# Patient Record
Sex: MALE | Race: BLACK OR AFRICAN AMERICAN | NOT HISPANIC OR LATINO | Employment: FULL TIME | URBAN - METROPOLITAN AREA
[De-identification: names, ages, dates, MRNs, and addresses within clinical notes are randomized per-mention and may not be internally consistent; named-entity substitution may affect disease eponyms.]

---

## 2022-10-10 ENCOUNTER — OFFICE VISIT (OUTPATIENT)
Dept: DERMATOLOGY | Facility: CLINIC | Age: 47
End: 2022-10-10
Payer: COMMERCIAL

## 2022-10-10 DIAGNOSIS — L73.1 PSEUDOFOLLICULITIS BARBAE: ICD-10-CM

## 2022-10-10 DIAGNOSIS — L91.8 ACROCHORDON: Primary | ICD-10-CM

## 2022-10-10 PROCEDURE — 1159F PR MEDICATION LIST DOCUMENTED IN MEDICAL RECORD: ICD-10-PCS | Mod: CPTII,S$GLB,, | Performed by: DERMATOLOGY

## 2022-10-10 PROCEDURE — 99204 PR OFFICE/OUTPT VISIT, NEW, LEVL IV, 45-59 MIN: ICD-10-PCS | Mod: S$GLB,,, | Performed by: DERMATOLOGY

## 2022-10-10 PROCEDURE — 99999 PR PBB SHADOW E&M-NEW PATIENT-LVL II: CPT | Mod: PBBFAC,,, | Performed by: DERMATOLOGY

## 2022-10-10 PROCEDURE — 1160F RVW MEDS BY RX/DR IN RCRD: CPT | Mod: CPTII,S$GLB,, | Performed by: DERMATOLOGY

## 2022-10-10 PROCEDURE — 1160F PR REVIEW ALL MEDS BY PRESCRIBER/CLIN PHARMACIST DOCUMENTED: ICD-10-PCS | Mod: CPTII,S$GLB,, | Performed by: DERMATOLOGY

## 2022-10-10 PROCEDURE — 1159F MED LIST DOCD IN RCRD: CPT | Mod: CPTII,S$GLB,, | Performed by: DERMATOLOGY

## 2022-10-10 PROCEDURE — 99204 OFFICE O/P NEW MOD 45 MIN: CPT | Mod: S$GLB,,, | Performed by: DERMATOLOGY

## 2022-10-10 PROCEDURE — 99999 PR PBB SHADOW E&M-NEW PATIENT-LVL II: ICD-10-PCS | Mod: PBBFAC,,, | Performed by: DERMATOLOGY

## 2022-10-10 RX ORDER — HYDROCHLOROTHIAZIDE 25 MG/1
25 TABLET ORAL DAILY
COMMUNITY
Start: 2022-08-04

## 2022-10-10 RX ORDER — CLINDAMYCIN PHOSPHATE 10 MG/G
GEL TOPICAL
Qty: 60 G | Refills: 3 | Status: SHIPPED | OUTPATIENT
Start: 2022-10-10

## 2022-10-10 RX ORDER — AMLODIPINE BESYLATE 10 MG/1
10 TABLET ORAL DAILY
COMMUNITY
Start: 2022-08-04

## 2022-10-10 NOTE — PROGRESS NOTES
Subjective:       Patient ID:  Timmy Simons is a 46 y.o. male who presents for   Chief Complaint   Patient presents with    Skin Tags     History of Present Illness: The patient presents with chief complaint of skin tags.  Location: base of neck, right temple  Duration: years  Signs/Symptoms: bothersome at times  Prior treatments: none     Review of Systems   Constitutional:  Negative for fever and chills.   Gastrointestinal:  Negative for nausea and vomiting.   Skin:  Positive for activity-related sunscreen use. Negative for daily sunscreen use and recent sunburn.   Hematologic/Lymphatic: Does not bruise/bleed easily.      Objective:    Physical Exam   Constitutional: He appears well-developed and well-nourished. No distress.   Neurological: He is alert and oriented to person, place, and time. He is not disoriented.   Psychiatric: He has a normal mood and affect.   Skin:   Areas Examined (abnormalities noted in diagram):   Head / Face Inspection Performed  Neck Inspection Performed  Chest / Axilla Inspection Performed  Abdomen Inspection Performed  Back Inspection Performed  RUE Inspected  LUE Inspection Performed  Nails and Digits Inspection Performed                     Diagram Legend       Pedunculated fleshy papule(s) c/w skin tag(s)         Assessment / Plan:        Acrochordon  Reassurance provided.  Informed patient that lesions are benign.  After risks, benefits and alternatives explained, including allergic reaction to anesthesia (if given), pain, recurrence, and scar, and verbal consent was obtained, 8 lesions treated with scissor excision.  Patient tolerated well.  Hemostasis achieved with aluminum chloride.  Verbal wound care instructions were given.     Pseudofolliculitis barbae  -     clindamycin phosphate 1% (CLINDAGEL) 1 % gel; AAA bid prn  Dispense: 60 g; Refill: 3  -     mild, will start above med bid prn.            Follow up if symptoms worsen or fail to improve.

## 2025-03-13 ENCOUNTER — TELEPHONE (OUTPATIENT)
Dept: DERMATOLOGY | Facility: CLINIC | Age: 50
End: 2025-03-13
Payer: COMMERCIAL

## 2025-03-13 NOTE — TELEPHONE ENCOUNTER
Returned call. Pt notified next available Wednesday morning is 7/2. Pt added to waitlist   ----- Message from Yaneli Valentin sent at 3/13/2025 12:01 PM CDT -----  Regarding: appt access  PATIENT CALLPt called regarding recurrent skin tags. Last seen 10/2022, would like an appt on any Wednesday morning that the provider has available. Please call back at [phone]

## 2025-07-02 ENCOUNTER — OFFICE VISIT (OUTPATIENT)
Dept: DERMATOLOGY | Facility: CLINIC | Age: 50
End: 2025-07-02
Payer: COMMERCIAL

## 2025-07-02 DIAGNOSIS — B07.9 FILIFORM WART: Primary | ICD-10-CM

## 2025-07-02 PROCEDURE — 99999 PR PBB SHADOW E&M-EST. PATIENT-LVL III: CPT | Mod: PBBFAC,,, | Performed by: DERMATOLOGY

## 2025-07-02 PROCEDURE — 1159F MED LIST DOCD IN RCRD: CPT | Mod: CPTII,S$GLB,, | Performed by: DERMATOLOGY

## 2025-07-02 PROCEDURE — 1160F RVW MEDS BY RX/DR IN RCRD: CPT | Mod: CPTII,S$GLB,, | Performed by: DERMATOLOGY

## 2025-07-02 PROCEDURE — 99212 OFFICE O/P EST SF 10 MIN: CPT | Mod: S$GLB,,, | Performed by: DERMATOLOGY

## 2025-07-02 NOTE — PATIENT INSTRUCTIONS
Wart Treatment Instructions  Once wart(s) heals from scissor removal, start over-the-counter 17% salicyclic acid cream or liquid at bedtime and cover with bandage.  Use a nail file or emery board to file down the wart several times/week to keep the wart soft.  Use this medication nightly until the wart resolves or for at least 3 weeks.      Cryosurgery    Your doctor has used a method called cryosurgery to treat your skin condition. Cryosurgery refers to the use of very cold substances to treat a variety of skin conditions such as warts, pre-skin cancers, molluscum contagiosum, sun spots, and several benign growths. The substance we use in cryosurgery is liquid nitrogen and is so cold (-195 degrees Celsius) that is burns when administered.     Following treatment in the office, the skin may immediately burn and become red. You may find the area around the lesion is affected as well. It is sometimes necessary to treat not only the lesion, but a small area of the surrounding normal skin to achieve a good response.     A blister, and even a blood filled blister, may form after treatment.   This is a normal response. If the blister is painful, it is acceptable to sterilize a needle and with rubbing alcohol and gently pop the blister. It is important that you gently wash the area with soap and warm water as the blister fluid may contain wart virus if a wart was treated. Do no remove the roof of the blister.     The area treated can take anywhere from 1-3 weeks to heal. Healing time depends on the kind of skin lesion treated, the location, and how aggressively the lesion was treated. It is recommended that the areas treated are covered with Vaseline or bacitracin ointment and a band-aid. If a band-aid is not practical, just ointment applied several times per day will do. Keeping these areas moist will speed the healing time.    Treatment with liquid nitrogen can leave a scar. In dark skin, it may be a light or dark scar,  in light skin it may be a white or pink scar. These will generally fade with time.    If you have any concerns after your treatment, please feel free to call the office.     What Are Warts?  Warts are common skin growths that can appear anywhere on the body. There are many types of warts. In most cases, they are benign (not cancer) and harmless. But warts can be embarrassing. And some warts are painful. The good news is that they can be treated.  Who Gets Warts?  Warts are most common in children and teens. But they can occur at any age. They are also more common in certain occupations, such as those that involve handling meat, poultry, or fish. A weakened immune system may make a person more prone to warts.  What Causes Warts?  Warts are caused by the human papillomavirus (HPV). There are over 150 types of HPV. This virus can spread between people. But you can be exposed to the virus and not get warts. Warts tend to form where skin is damaged or broken. But they can also appear elsewhere. Left untreated, warts can grow in number. They can also spread to other parts of the body.    Types of Warts  There are many types of warts. Some of the most common ones are described below:  Common warts have a raised, rough surface. Enlarged blood vessels in the warts look like dots on the warts surface. Common warts form mainly on the hands, but can appear on other parts of the body.  Plantar warts are warts appearing on the soles of the feet. When you stand or walk, pressure makes plantar warts painful. When they form in clusters, plantar warts are called mosaic warts.  Periungual warts form under and around fingernails. People who bite their nails are more at risk.  Filiform warts are slender, fingerlike growths that can dangle from the skin. They most often appear on the face and neck.  Flat warts are small, smooth growths. They tend to form in clusters on the face, backs of the hands, or legs.  Genital warts (condyloma)  can appear on or around the genitals. Because these warts can spread and are linked to cervical, anal, and other cancers, it is important to have them treated promptly.  © 1949-7609 [employer] [address] [place] [address], [place] [address]. All rights reserved. This information is not intended as a substitute for professional medical care. Always follow your healthcare professional's instructions.        [facility]  [address] [place] [address]  Dept: [phone]  Dept Fax: [phone]

## 2025-07-02 NOTE — PROGRESS NOTES
Subjective:      Patient ID:  Timmy Simons is a 49 y.o. male who presents for   Chief Complaint   Patient presents with    Mole     Reports spot below right nostril.  Pt seeking removal.  New since last visit.       Hx of PFB and acrochordon, last seen on 10/10/22.  Here today for new issue:    History of Present Illness: The patient presents with chief complaint of lesion.  Location: right nostril   Duration: several months  Signs/Symptoms: none    Prior treatments: none      Mole        Review of Systems   Constitutional:  Negative for fever and chills.   Gastrointestinal:  Negative for nausea and vomiting.   Skin:  Negative for recent sunburn.   Hematologic/Lymphatic: Does not bruise/bleed easily.       Objective:   Physical Exam   Constitutional: He appears well-developed and well-nourished. No distress.   Neurological: He is alert and oriented to person, place, and time. He is not disoriented.   Psychiatric: He has a normal mood and affect.   Skin:   Areas Examined (abnormalities noted in diagram):   Head / Face Inspection Performed  Neck Inspection Performed  RUE Inspected  LUE Inspection Performed  Nails and Digits Inspection Performed           Diagram Legend       Verrucoid papule consistent consistent with wart       Assessment / Plan:        Filiform wart  Reassurance provided.  Informed patient that lesions are benign.  After risks, benefits and alternatives explained, including allergic reaction to anesthesia (if given), pain, recurrence, and scar, and verbal consent was obtained, 1 lesions treated with scissor excision.  Patient tolerated well.  Hemostasis achieved with aluminum chloride.  Verbal wound care instructions were given.     Once wart(s) heals from scissor excision, start over-the-counter 17% salicyclic acid cream or liquid at bedtime and cover with bandage.             Follow up if symptoms worsen or fail to improve.